# Patient Record
Sex: MALE | Race: WHITE | NOT HISPANIC OR LATINO | URBAN - METROPOLITAN AREA
[De-identification: names, ages, dates, MRNs, and addresses within clinical notes are randomized per-mention and may not be internally consistent; named-entity substitution may affect disease eponyms.]

---

## 2019-02-02 ENCOUNTER — INPATIENT (INPATIENT)
Facility: HOSPITAL | Age: 54
LOS: 0 days | Discharge: HOME | DRG: 93 | End: 2019-02-03
Attending: PSYCHIATRY & NEUROLOGY | Admitting: PSYCHIATRY & NEUROLOGY
Payer: COMMERCIAL

## 2019-02-02 VITALS
RESPIRATION RATE: 17 BRPM | WEIGHT: 193.57 LBS | TEMPERATURE: 97 F | SYSTOLIC BLOOD PRESSURE: 131 MMHG | DIASTOLIC BLOOD PRESSURE: 80 MMHG | HEART RATE: 94 BPM | OXYGEN SATURATION: 97 %

## 2019-02-02 LAB
ALBUMIN SERPL ELPH-MCNC: 4.7 G/DL — SIGNIFICANT CHANGE UP (ref 3.3–5)
ALP SERPL-CCNC: 54 U/L — SIGNIFICANT CHANGE UP (ref 40–120)
ALT FLD-CCNC: 21 U/L — SIGNIFICANT CHANGE UP (ref 10–45)
ANION GAP SERPL CALC-SCNC: 10 MMOL/L — SIGNIFICANT CHANGE UP (ref 5–17)
APTT BLD: 33.7 SEC — SIGNIFICANT CHANGE UP (ref 27.5–36.3)
AST SERPL-CCNC: 16 U/L — SIGNIFICANT CHANGE UP (ref 10–40)
BASOPHILS NFR BLD AUTO: 0.9 % — SIGNIFICANT CHANGE UP (ref 0–2)
BILIRUB SERPL-MCNC: 0.3 MG/DL — SIGNIFICANT CHANGE UP (ref 0.2–1.2)
BLD GP AB SCN SERPL QL: NEGATIVE — SIGNIFICANT CHANGE UP
BUN SERPL-MCNC: 19 MG/DL — SIGNIFICANT CHANGE UP (ref 7–23)
CALCIUM SERPL-MCNC: 10.1 MG/DL — SIGNIFICANT CHANGE UP (ref 8.4–10.5)
CHLORIDE SERPL-SCNC: 101 MMOL/L — SIGNIFICANT CHANGE UP (ref 96–108)
CO2 SERPL-SCNC: 29 MMOL/L — SIGNIFICANT CHANGE UP (ref 22–31)
CREAT SERPL-MCNC: 1.01 MG/DL — SIGNIFICANT CHANGE UP (ref 0.5–1.3)
EOSINOPHIL NFR BLD AUTO: 1.7 % — SIGNIFICANT CHANGE UP (ref 0–6)
GLUCOSE SERPL-MCNC: 85 MG/DL — SIGNIFICANT CHANGE UP (ref 70–99)
HCT VFR BLD CALC: 46 % — SIGNIFICANT CHANGE UP (ref 39–50)
HGB BLD-MCNC: 15.8 G/DL — SIGNIFICANT CHANGE UP (ref 13–17)
INR BLD: 0.98 — SIGNIFICANT CHANGE UP (ref 0.88–1.16)
LYMPHOCYTES # BLD AUTO: 38.3 % — SIGNIFICANT CHANGE UP (ref 13–44)
MCHC RBC-ENTMCNC: 31.5 PG — SIGNIFICANT CHANGE UP (ref 27–34)
MCHC RBC-ENTMCNC: 34.3 G/DL — SIGNIFICANT CHANGE UP (ref 32–36)
MCV RBC AUTO: 91.6 FL — SIGNIFICANT CHANGE UP (ref 80–100)
MONOCYTES NFR BLD AUTO: 13.8 % — SIGNIFICANT CHANGE UP (ref 2–14)
NEUTROPHILS NFR BLD AUTO: 45.3 % — SIGNIFICANT CHANGE UP (ref 43–77)
PLATELET # BLD AUTO: 252 K/UL — SIGNIFICANT CHANGE UP (ref 150–400)
POTASSIUM SERPL-MCNC: 4.4 MMOL/L — SIGNIFICANT CHANGE UP (ref 3.5–5.3)
POTASSIUM SERPL-SCNC: 4.4 MMOL/L — SIGNIFICANT CHANGE UP (ref 3.5–5.3)
PROT SERPL-MCNC: 7.3 G/DL — SIGNIFICANT CHANGE UP (ref 6–8.3)
PROTHROM AB SERPL-ACNC: 11.1 SEC — SIGNIFICANT CHANGE UP (ref 10–12.9)
RBC # BLD: 5.02 M/UL — SIGNIFICANT CHANGE UP (ref 4.2–5.8)
RBC # FLD: 15 % — SIGNIFICANT CHANGE UP (ref 10.3–16.9)
RH IG SCN BLD-IMP: POSITIVE — SIGNIFICANT CHANGE UP
SODIUM SERPL-SCNC: 140 MMOL/L — SIGNIFICANT CHANGE UP (ref 135–145)
WBC # BLD: 6.4 K/UL — SIGNIFICANT CHANGE UP (ref 3.8–10.5)
WBC # FLD AUTO: 6.4 K/UL — SIGNIFICANT CHANGE UP (ref 3.8–10.5)

## 2019-02-02 PROCEDURE — 71045 X-RAY EXAM CHEST 1 VIEW: CPT | Mod: 26

## 2019-02-02 PROCEDURE — 70450 CT HEAD/BRAIN W/O DYE: CPT | Mod: 26,59

## 2019-02-02 PROCEDURE — 99291 CRITICAL CARE FIRST HOUR: CPT

## 2019-02-02 PROCEDURE — 70450 CT HEAD/BRAIN W/O DYE: CPT | Mod: 26,77

## 2019-02-02 PROCEDURE — 70496 CT ANGIOGRAPHY HEAD: CPT | Mod: 26

## 2019-02-02 PROCEDURE — 0042T: CPT

## 2019-02-02 PROCEDURE — ZZZZZ: CPT

## 2019-02-02 PROCEDURE — 70498 CT ANGIOGRAPHY NECK: CPT | Mod: 26

## 2019-02-02 RX ORDER — ZOLPIDEM TARTRATE 10 MG/1
5 TABLET ORAL ONCE
Qty: 0 | Refills: 0 | Status: DISCONTINUED | OUTPATIENT
Start: 2019-02-02 | End: 2019-02-03

## 2019-02-02 RX ORDER — CHLORHEXIDINE GLUCONATE 213 G/1000ML
1 SOLUTION TOPICAL
Qty: 0 | Refills: 0 | Status: DISCONTINUED | OUTPATIENT
Start: 2019-02-02 | End: 2019-02-03

## 2019-02-02 RX ORDER — LANOLIN ALCOHOL/MO/W.PET/CERES
5 CREAM (GRAM) TOPICAL AT BEDTIME
Qty: 0 | Refills: 0 | Status: DISCONTINUED | OUTPATIENT
Start: 2019-02-02 | End: 2019-02-03

## 2019-02-02 NOTE — ED CLERICAL - NS ED CLERK NOTE PRE-ARRIVAL INFORMATION; ADDITIONAL PRE-ARRIVAL INFORMATION
54 Y/O SOLE, Arnie Manzano ( 1965). Being Sent by Dr. Marte ([phone]) for NRO Eval and to R/O TIA vs. CVA.

## 2019-02-02 NOTE — H&P ADULT - ASSESSMENT
54 yo M with a history of cavernous angioma since childhood, here with acute onset L sided numbness/weakness and L facial numbness. His symptoms have since resolved, however given concern for cerebellar hemorrhage admitted to ICU for close neurological monitoring.     Neurology:  #cavernous angioma, r/o hemorrhage - given symptoms of L sided weakness and numbness (now resolved) and L sided facial numbness, c/f small hemorrhage associated with angioma.   -     Pulmonary:    Cardiovascular:    GI:    :    MSK:    Endocrine:    Heme:    ID:    FEN: NPO, Replete lytes PRN K>4, Mg>2    PPx: Hep SQ, SCDs    Code: FULL CODE    Dispo: Patient requires continued monitoring in MICU 54 yo M with a history of cavernous angioma since childhood, here with acute onset L sided numbness/weakness and L facial numbness. His symptoms have since resolved, however given concern for cerebellar hemorrhage admitted to ICU for close neurological monitoring.     Neurology:  #cavernous angioma, r/o hemorrhage - given symptoms of L sided weakness and numbness (now resolved) and L sided facial numbness, c/f small hemorrhage associated with angioma.  - frequent neuro checks  - repeat CT scan in 6 hours or earlier with new symptoms  - hold home aspirin  - transfuse 1 U platelets now due to platelet dysfunction from aspirin  - maintain SBP < 140    Pulmonary:  #no active issues    Cardiovascular:  #hemodynamics - NSR and hemodynamically stable  - strict SBPs < 140    - hold home aspirin    GI:  #no active issues    :  #no active issues    MSK:  #no active issues    Endocrine:  #no active issues    ID:  #no active issues    FEN: regular diet, Replete lytes PRN K>4, Mg>2, no IVF    PPx: holding HSQ due to possible hemorrhage; SCDs    Code: FULL CODE    Dispo: Patient requires continued monitoring in MICU, plan to stepdown to [address] [place] after 24 hours if no change.

## 2019-02-02 NOTE — ED PROVIDER NOTE - MEDICAL DECISION MAKING DETAILS
Pt in ED with neuro symptoms of left sided upper and lower extremity transient weakness and left sided facial numbness/tingling - all symptoms are resolved on ER arrival.  Code stroke called on ED arrival and patient is taken for imaging.  Stroke team in ED to eval patient and in agreement for current neurological deficits at this time.  CT imaging showing small cerebellar hemorrhage and Dr. Whaley requesting admission to him with placement to ICU for 24 hours prior to stepdown.  Neurosx team is also consulted and will see patient in ED.  Pt noted to be on ASA, however Dr. Whaley does not want platelets, etc at this time.  Plan is discussed with patient who is in agreement.  Will admit at this time.

## 2019-02-02 NOTE — ED ADULT NURSE NOTE - OBJECTIVE STATEMENT
pt presents stating he woke up at 0300 with left sided weakness and a numbness to left side of head.  states it resolved on its own and then came back at 0700.  states he had similar episode 10 years ago and was dx with TIA.  stroke code called.  CT/CTA completed.  Neuro at bedside.  no active deficits noted.  speaking in full clear sentences.  equal strength noted BL.  ambulates with steady gait. denies chest pain, palpitations, sob, dizziness.

## 2019-02-02 NOTE — CONSULT NOTE ADULT - SUBJECTIVE AND OBJECTIVE BOX
53 year old male with history of prior TIA (about 10 years ago) presents to ED with concern for waking up this morning with weakness to left upper and lower extremity and left sided facial tingling/numbness that lasted 30 minutes before resolving completely.  Patient states he was ok when he went to sleep yesterday evening at 11pm and awoke with these symptoms.  He denies associated headache, changes to vision, chest pain, shortness of breath, abdominal pain, nausea, vomiting, fever, chills, or any additional acute complaints or concerns at this time.  Patient states he takes a daily ASA 81mg for history of TIA and a sleeping pill PRN.     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	  · EYES: no discharge, no irritation, no pain, no redness, and no visual changes.	  · ENMT: Ears: no ear pain and no hearing problems.Nose: no nasal congestion and no nasal drainage.Mouth/Throat: no dysphagia, no hoarseness and no throat pain.Neck: no lumps, no pain, no stiffness and no swollen glands.	  · CARDIOVASCULAR: no chest pain and no edema.	  · RESPIRATORY: no chest pain, no cough, and no shortness of breath.	  · GASTROINTESTINAL: no abdominal pain, no bloating, no constipation, no diarrhea, no nausea and no vomiting.	  · GENITOURINARY: no dysuria, no frequency, and no hematuria.	  · MUSCULOSKELETAL: no back pain, no gout, no musculoskeletal pain, no neck pain, and no weakness.	  · SKIN: no abrasions, no jaundice, no lesions, no pruritis, and no rashes.	  · NEUROLOGICAL: - - -	  · Neurological [+]: + left sided upper and lower extremity weakness - resolved.  + Left sided facial numbness/tingling.	  · PSYCHIATRIC: no known mental health issues.	    PHYSICAL EXAM:   · CONSTITUTIONAL: Well appearing, well nourished, awake, alert, oriented to person, place, time/situation and in no apparent distress.	  · ENMT: Airway patent, Nasal mucosa clear. Mouth with normal mucosa. Throat has no vesicles, no oropharyngeal exudates and uvula is midline.	  · EYES: Clear bilaterally, pupils equal, round and reactive to light.	  · CARDIAC: Normal rate, regular rhythm.  Heart sounds S1, S2.  No murmurs, rubs or gallops.	  · RESPIRATORY: Breath sounds clear and equal bilaterally.	  · GASTROINTESTINAL: Abdomen soft, non-tender, no guarding.	  · MUSCULOSKELETAL: Spine appears normal, range of motion is not limited, no muscle or joint tenderness	  · NEUROLOGICAL: Alert and oriented, 5/5 strength x 4 extremities against gravity and external force.  No facial asymmetry.  Speaking without slurred speech.  Sensation is intact and symmetric x 4 extremities, sensation intact and symmetric to face.	  · SKIN: Skin normal color for race, warm, dry and intact. No evidence of rash.	    CT 2/2 head demonstrates small right cerebellar hyperdensity    CTA 2/2 head demonstrates right DVA vs venous angioma 53 year old male with history of prior TIA (about 10 years ago) presents to ED with concern for waking up this morning with weakness to left upper and lower extremity and left sided facial tingling/numbness that lasted 30 minutes before resolving completely.  Patient states he was ok when he went to sleep yesterday evening at 11pm and awoke with these symptoms.  He denies associated headache, changes to vision, chest pain, shortness of breath, abdominal pain, nausea, vomiting, fever, chills, or any additional acute complaints or concerns at this time.  Patient states he takes a daily ASA 81mg for history of TIA and a sleeping pill PRN.     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	  · EYES: no discharge, no irritation, no pain, no redness, and no visual changes.	  · ENMT: Ears: no ear pain and no hearing problems.Nose: no nasal congestion and no nasal drainage.Mouth/Throat: no dysphagia, no hoarseness and no throat pain.Neck: no lumps, no pain, no stiffness and no swollen glands.	  · CARDIOVASCULAR: no chest pain and no edema.	  · RESPIRATORY: no chest pain, no cough, and no shortness of breath.	  · GASTROINTESTINAL: no abdominal pain, no bloating, no constipation, no diarrhea, no nausea and no vomiting.	  · GENITOURINARY: no dysuria, no frequency, and no hematuria.	  · MUSCULOSKELETAL: no back pain, no gout, no musculoskeletal pain, no neck pain, and no weakness.	  · SKIN: no abrasions, no jaundice, no lesions, no pruritis, and no rashes.	  · NEUROLOGICAL: - - -	  · Neurological [+]: + left sided upper and lower extremity weakness - resolved.  + Left sided facial numbness/tingling.	  · PSYCHIATRIC: no known mental health issues.	    PHYSICAL EXAM:   · CONSTITUTIONAL: Well appearing, well nourished, awake, alert, oriented to person, place, time/situation and in no apparent distress.	  · ENMT: Airway patent, Nasal mucosa clear. Mouth with normal mucosa. Throat has no vesicles, no oropharyngeal exudates and uvula is midline.	  · EYES: Clear bilaterally, pupils equal, round and reactive to light.	  · CARDIAC: Normal rate, regular rhythm.  Heart sounds S1, S2.  No murmurs, rubs or gallops.	  · RESPIRATORY: Breath sounds clear and equal bilaterally.	  · GASTROINTESTINAL: Abdomen soft, non-tender, no guarding.	  · MUSCULOSKELETAL: Spine appears normal, range of motion is not limited, no muscle or joint tenderness	  · NEUROLOGICAL: Alert and oriented, 5/5 strength x 4 extremities against gravity and external force.  No facial asymmetry.  Speaking without slurred speech.  Sensation is intact and symmetric x 4 extremities, sensation intact and symmetric to face.	  · SKIN: Skin normal color for race, warm, dry and intact. No evidence of rash.	    CT 2/2 head demonstrates small right cerebellar hyperdensity    CTA 2/2 head demonstrates right developmental venous anomaly vs cavernous angioma

## 2019-02-02 NOTE — CONSULT NOTE ADULT - ASSESSMENT
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 Malik / [place]

## 2019-02-02 NOTE — CONSULT NOTE ADULT - SUBJECTIVE AND OBJECTIVE BOX
**STROKE CODE CONSULT NOTE**    Last known well time/Time of onset of symptoms: 11pm on 2/1    HPI:  53M mostly Urdu-speaking PMH seizure (one time episodes 30 yrs ago), R cavernoma, ?PFO presenting due to L sided numbness and weakness that woke him from sleep at 3am. Lasted 30 minutes and went back to sleep. Again, same symptoms woke him at 7am, which resolved but was followed by L facial numbness that persisted in the ED. Stroke code called with NIHSS 1 for chronic LUE and LLE numbness that he has had for many years. Denies any f/c, HA, CP, SOB ,abdominal pain, n/v/d/c, dysuria.    CT head showing R cerebellar small hemorrhage  ED Vitals: T 97.2F, HR 94, 131/80, 17, 97% on RA      PAST MEDICAL & SURGICAL HISTORY:  Cavernous angioma: of R cerebellum  No significant past surgical history      FAMILY HISTORY:  No pertinent family history in first degree relatives  - no strokes, HTN, heart attack      MEDICATIONS  (STANDING):  chlorhexidine 2% Cloths 1 Application(s) Topical <User Schedule>  melatonin 5 milliGRAM(s) Oral at bedtime    MEDICATIONS  (PRN):  zolpidem 5 milliGRAM(s) Oral once PRN Insomnia      Allergies    No Known Allergies    Intolerances        Vital Signs Last 24 Hrs  T(C): 36.4 (02 Feb 2019 21:54), Max: 36.8 (02 Feb 2019 13:23)  T(F): 97.6 (02 Feb 2019 21:54), Max: 98.2 (02 Feb 2019 13:23)  HR: 70 (02 Feb 2019 23:00) (64 - 98)  BP: 114/70 (02 Feb 2019 23:00) (114/70 - 141/71)  BP(mean): 86 (02 Feb 2019 23:00) (85 - 105)  RR: 19 (02 Feb 2019 23:00) (16 - 25)  SpO2: 96% (02 Feb 2019 23:00) (95% - 99%)    PHYSICAL EXAM:  Constitutional: WDWN; NAD  Cardiovascular: RRR, no appreciable murmurs; no carotid bruits  Neurologic:  Mental status: Awake, alert and oriented x3. Attention/concentration intact.  No dysarthria, no aphasia.  Fund of knowledge appropriate.    Cranial nerves: Pupils equally round and reactive to light, visual fields full, no nystagmus, extraocular muscles intact, V1 through V3 intact bilaterally and symmetric, face symmetric, palate elevation symmetric, tongue was midline, sternocleidomastoid/shoulder shrug strength bilaterally 5/5.    Motor:  Normal bulk and tone, strength 5/5 in bilateral upper and lower extremities.   strength 5/5.  Rapid alternating movements intact and symmetric.   Sensation: Intact to light touch.  No neglect.   Coordination: No dysmetria on finger-to-nose and heel-to-shin.  No clumsiness.  Gait: deferred    NIHSS:    Fingerstick Blood Glucose: CAPILLARY BLOOD GLUCOSE  89 (02 Feb 2019 12:41)      POCT Blood Glucose.: 89 mg/dL (02 Feb 2019 11:32)       LABS:                        15.8   6.4   )-----------( 252      ( 02 Feb 2019 11:47 )             46.0     02-02    140  |  101  |  19  ----------------------------<  85  4.4   |  29  |  1.01    Ca    10.1      02 Feb 2019 11:47  Mg     2.2     02-02    TPro  7.3  /  Alb  4.7  /  TBili  0.3  /  DBili  x   /  AST  16  /  ALT  21  /  AlkPhos  54  02-02    PT/INR - ( 02 Feb 2019 11:47 )   PT: 11.1 sec;   INR: 0.98          PTT - ( 02 Feb 2019 11:47 )  PTT:33.7 sec  CARDIAC MARKERS ( 02 Feb 2019 11:47 )  x     / <0.01 ng/mL / x     / x     / x              RADIOLOGY & ADDITIONAL STUDIES:    CT Head No Cont (02.02.19 @ 12:16)   Small hyperdense focus in the right cerebellum with probable   thin linear area of low attenuating along its anterior margin possibly   representing a minimal amount of parenchymal edema. Differential   diagnosis include the following: Cavernoma, small parenchymal hematoma,   or hyperdense/hemorrhagic lesion. Further evaluation with contrast MRI   can be obtained for clarification.    No evidence of acute infarction.     CT Perfusion w/ Maps w/ IV Cont (02.02.19 @ 12:18)  No areas of perfusion abnormality.     CT Angio Neck w/ IV Cont (02.02.19 @ 12:19)  No high-grade stenotic or occlusive lesion.    No hemodynamic significant stenosis at the carotid bifurcation.    Draining veins within the right lateral cerebellum adjacent to the   hyperdense focus. Constellation of findings are suggestive for   developmental venous anomalies with and adjacent cavernous angioma. MRI   with contrast can be obtained for confirmation.          IV-tPA (Y/N):    No                                 Reason IV-tPA not given: ICH      ASSESSMENT/PLAN:  53M mostly Urdu-speaking PMH seizure (one time episodes 30 yrs ago), R cavernoma, ?PFO presenting due to L sided numbness and weakness that woke him from sleep twice overnight, found to have a R cerebellar hemorrhage likely from R cavernoma.    #R cerebellar hemorrhage from R cavernoma  - hold antiplatelets  - transfuse 1u plts in setting of recent aspirin and NSAID use  - repeat CT head in 6 hours to assess for changes in hemorrhage  - neurosurgery consulted, f/u recs  - SBP goal <140

## 2019-02-02 NOTE — H&P ADULT - NSHPLABSRESULTS_GEN_ALL_CORE
LABS:                        15.8   6.4   )-----------( 252      ( 02 Feb 2019 11:47 )             46.0     02-02    140  |  101  |  19  ----------------------------<  85  4.4   |  29  |  1.01    Ca    10.1      02 Feb 2019 11:47  Mg     2.2     02-02    TPro  7.3  /  Alb  4.7  /  TBili  0.3  /  DBili  x   /  AST  16  /  ALT  21  /  AlkPhos  54  02-02    PT/INR - ( 02 Feb 2019 11:47 )   PT: 11.1 sec;   INR: 0.98     PTT - ( 02 Feb 2019 11:47 )  PTT:33.7 sec    CAPILLARY BLOOD GLUCOSE  89 (02 Feb 2019 12:41)    POCT Blood Glucose.: 89 mg/dL (02 Feb 2019 11:32)    RADIOLOGY & ADDITIONAL TESTS:  < from: CT Head No Cont (02.02.19 @ 12:16) >      IMPRESSION: Small hyperdense focus in the right cerebellum with probable   thin linear area of low attenuating along its anterior margin possibly   representing a minimal amount of parenchymal edema. Differential   diagnosis include the following: Cavernoma, small parenchymal hematoma,   or hyperdense/hemorrhagic lesion. Further evaluation with contrast MRI   can be obtained for clarification.    No evidence of acute infarction.    < end of copied text >    < from: CT Perfusion w/ Maps w/ IV Cont (02.02.19 @ 12:18) >    IMPRESSION: No areas of perfusion abnormality.    < end of copied text >    < from: CT Angio Neck w/ IV Cont (02.02.19 @ 12:19) >    IMPRESSION:   No high-grade stenotic or occlusive lesion.    No hemodynamic significant stenosis at the carotid bifurcation.    Draining veins within the right lateral cerebellum adjacent to the   hyperdense focus. Constellation of findings are suggestive for   developmental venous anomalies with and adjacent cavernous angioma. MRI   with contrast can be obtained for confirmation.    < end of copied text >

## 2019-02-02 NOTE — ED ADULT TRIAGE NOTE - CHIEF COMPLAINT QUOTE
Patient, ambulating with steady gait, complaining of left UE and LE weakness and heaviness, starting at 3:00AM this morning.  Symptoms resolved and returned at 7:30AM.  No facial droop, slurred speech or neuro deficits notes.  Patient denies any CP, SOB, dizziness, N/V/D or any other complaints.  FS 89, EKG in progress.

## 2019-02-02 NOTE — H&P ADULT - NSHPSOCIALHISTORY_GEN_ALL_CORE
Former smoker, <1 ppd x 15 years, quit in 2003. Rare alcohol drinker. No illicit drug use.   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  with 2 children - wife is currently in [place], 1 child in [place], 1 child in [place].

## 2019-02-02 NOTE — H&P ADULT - NSHPPHYSICALEXAM_GEN_ALL_CORE
PHYSICAL EXAM:   VITAL SIGNS:  T(C): 36.6 (02-02-19 @ 14:44), Max: 36.8 (02-02-19 @ 13:23)  HR: 70 (02-02-19 @ 16:00) (64 - 94)  BP: 135/92 (02-02-19 @ 16:00) (127/77 - 139/87)  RR: 20 (02-02-19 @ 16:00) (16 - 20)  SpO2: 97% (02-02-19 @ 16:00) (96% - 97%)  Constitutional: well appearing middle aged man resting comfortably in bed; NAD  Head: NC/AT  Eyes: clear conjunctiva, no scleral icterus  ENT: no nasal discharge; uvula midline, no oropharyngeal erythema or exudates; MMM  Neck: supple; no JVD  Respiratory: CTA B/L; no W/R/R  Cardiac: +S1/S2; RRR; no M/R/G  Gastrointestinal: soft, NT/ND; no rebound or guarding  Extremities: WWP, no clubbing or cyanosis; no peripheral edema  Vascular: 2+ radial, DP/PT pulses B/L  Dermatologic: skin warm, dry and intact; no rashes, wounds, or scars  Neurologic: AAOx3; PERRL, EOMI, no facial weakness or asymmetry, mild numbness in L lower face, shoulder shrug and head turn equal bilaterally, tongue midline, uvula midline. 5/5 strength in all four extremities, no sensory deficits, no dysdiadochokinesia.

## 2019-02-02 NOTE — H&P ADULT - HISTORY OF PRESENT ILLNESS
54 yo M with a history of kal 54 yo M with a history of cavernous angioma since childhood, prior TIA 10 years ago, on aspirin for ASCVD prophylaxis, who presented to the ED with L sided facial numbness and arm/leg weakness and numbness x 30 minutes. The patient was in his usual state of health when last night he had an episode of acute left arm and leg numbness and weakness, which resolved after 30 minutes. Then, on waking this morning, he had a second episode of arm and leg weakness in addition to lower facial numbness; the arm and leg symptoms resolved after 30 minutes but the facial tingling/numbness has persisted. He also complains of mild holocephalic headache (1/10 in intensity). He denies f/c, CP, SOB, n/v/d, abd pain, gait abnormalities, dysarthria, dysphagia.     In the [facility] ED, /80, HR 84, RR 17, T 97.2F, O2Sat 97% on RA. Stroke code was called, NIHSS 1 for L facial numbness/tingling. CT and CTA H&N showed small hyperdense focus in R cerebellum with possible minimal amount of parenchymal edema, with contrast drainage suggesting developmental venous anomalies and adjacent cavernous angioma. He was admitted to the MICU for close neurological monitoring. 54 yo M with a history of cavernous angioma since childhood, prior TIA 10 years ago, on aspirin for ASCVD prophylaxis, who presented to the ED with L sided facial numbness and arm/leg weakness and numbness x 30 minutes. The patient was in his usual state of health when last night he had an episode of acute left arm and leg numbness and weakness, which resolved after 30 minutes. Then, on waking this morning at 7 AM, he had a second episode of arm and leg weakness in addition to lower facial numbness; the arm and leg symptoms resolved after 30 minutes but the facial tingling/numbness has persisted. He also complains of mild holocephalic headache (1/10 in intensity). He denies f/c, CP, SOB, n/v/d, abd pain, gait abnormalities, dysarthria, dysphagia.     In the [facility] ED, /80, HR 84, RR 17, T 97.2F, O2Sat 97% on RA. Stroke code was called, NIHSS 1 for L facial numbness/tingling. CT and CTA H&N showed small hyperdense focus in R cerebellum with possible minimal amount of parenchymal edema, with contrast drainage suggesting developmental venous anomalies and adjacent cavernous angioma. He was admitted to the MICU for close neurological monitoring.

## 2019-02-02 NOTE — ED PROVIDER NOTE - OBJECTIVE STATEMENT
53 year old male with history of prior TIA (apx 10 years ago) presents to ED with concern for waking up this morning with weakness to left upper and lower extremity and left sided facial tingling/numbness that lasted apx 30 minutes before resolving completely.  Patient states he was ok when he went to sleep yesterday evening at 11pm and awoke with these symptoms.  He denies associated headache, changes to vision, chest pain, shortness of breath, abdominal pain, nausea, vomiting, fever, chills, or any additional acute complaints or concerns at this time.  Patient states he takes a daily ASA for history of TIA and a sleeping pill PRN.

## 2019-02-02 NOTE — ED PROVIDER NOTE - CRITICAL CARE PROVIDED
additional history taking/interpretation of diagnostic studies/consultation with other physicians/documentation

## 2019-02-02 NOTE — ED PROVIDER NOTE - NEUROLOGICAL, MLM
Alert and oriented, 5/5 strength x 4 extremities against gravity and external force.  No facial asymmetry.  Speaking without slurred speech.  Sensation is intact and symmetric x 4 extremities, sensation intact and symmetric to face.

## 2019-02-03 VITALS — OXYGEN SATURATION: 95 % | RESPIRATION RATE: 19 BRPM | HEART RATE: 86 BPM

## 2019-02-03 LAB
ANION GAP SERPL CALC-SCNC: 11 MMOL/L — SIGNIFICANT CHANGE UP (ref 5–17)
APTT BLD: 32.4 SEC — SIGNIFICANT CHANGE UP (ref 27.5–36.3)
BUN SERPL-MCNC: 16 MG/DL — SIGNIFICANT CHANGE UP (ref 7–23)
CALCIUM SERPL-MCNC: 9.6 MG/DL — SIGNIFICANT CHANGE UP (ref 8.4–10.5)
CHLORIDE SERPL-SCNC: 107 MMOL/L — SIGNIFICANT CHANGE UP (ref 96–108)
CO2 SERPL-SCNC: 23 MMOL/L — SIGNIFICANT CHANGE UP (ref 22–31)
CREAT SERPL-MCNC: 0.89 MG/DL — SIGNIFICANT CHANGE UP (ref 0.5–1.3)
GLUCOSE SERPL-MCNC: 94 MG/DL — SIGNIFICANT CHANGE UP (ref 70–99)
HCT VFR BLD CALC: 43.1 % — SIGNIFICANT CHANGE UP (ref 39–50)
HCV AB S/CO SERPL IA: 0.06 S/CO — SIGNIFICANT CHANGE UP
HCV AB SERPL-IMP: SIGNIFICANT CHANGE UP
HGB BLD-MCNC: 15 G/DL — SIGNIFICANT CHANGE UP (ref 13–17)
INR BLD: 1.03 — SIGNIFICANT CHANGE UP (ref 0.88–1.16)
MCHC RBC-ENTMCNC: 31.8 PG — SIGNIFICANT CHANGE UP (ref 27–34)
MCHC RBC-ENTMCNC: 34.8 G/DL — SIGNIFICANT CHANGE UP (ref 32–36)
MCV RBC AUTO: 91.5 FL — SIGNIFICANT CHANGE UP (ref 80–100)
PLATELET # BLD AUTO: 277 K/UL — SIGNIFICANT CHANGE UP (ref 150–400)
POTASSIUM SERPL-MCNC: 4.4 MMOL/L — SIGNIFICANT CHANGE UP (ref 3.5–5.3)
POTASSIUM SERPL-SCNC: 4.4 MMOL/L — SIGNIFICANT CHANGE UP (ref 3.5–5.3)
PROTHROM AB SERPL-ACNC: 11.6 SEC — SIGNIFICANT CHANGE UP (ref 10–12.9)
RBC # BLD: 4.71 M/UL — SIGNIFICANT CHANGE UP (ref 4.2–5.8)
RBC # FLD: 15.1 % — SIGNIFICANT CHANGE UP (ref 10.3–16.9)
SODIUM SERPL-SCNC: 141 MMOL/L — SIGNIFICANT CHANGE UP (ref 135–145)
WBC # BLD: 6 K/UL — SIGNIFICANT CHANGE UP (ref 3.8–10.5)
WBC # FLD AUTO: 6 K/UL — SIGNIFICANT CHANGE UP (ref 3.8–10.5)

## 2019-02-03 PROCEDURE — 99291 CRITICAL CARE FIRST HOUR: CPT

## 2019-02-03 PROCEDURE — 85027 COMPLETE CBC AUTOMATED: CPT

## 2019-02-03 PROCEDURE — 70498 CT ANGIOGRAPHY NECK: CPT

## 2019-02-03 PROCEDURE — 85379 FIBRIN DEGRADATION QUANT: CPT

## 2019-02-03 PROCEDURE — 86850 RBC ANTIBODY SCREEN: CPT

## 2019-02-03 PROCEDURE — 70496 CT ANGIOGRAPHY HEAD: CPT

## 2019-02-03 PROCEDURE — 99233 SBSQ HOSP IP/OBS HIGH 50: CPT

## 2019-02-03 PROCEDURE — 82962 GLUCOSE BLOOD TEST: CPT

## 2019-02-03 PROCEDURE — 85730 THROMBOPLASTIN TIME PARTIAL: CPT

## 2019-02-03 PROCEDURE — 83735 ASSAY OF MAGNESIUM: CPT

## 2019-02-03 PROCEDURE — 36430 TRANSFUSION BLD/BLD COMPNT: CPT

## 2019-02-03 PROCEDURE — 86803 HEPATITIS C AB TEST: CPT

## 2019-02-03 PROCEDURE — 80048 BASIC METABOLIC PNL TOTAL CA: CPT

## 2019-02-03 PROCEDURE — 70450 CT HEAD/BRAIN W/O DYE: CPT

## 2019-02-03 PROCEDURE — 84484 ASSAY OF TROPONIN QUANT: CPT

## 2019-02-03 PROCEDURE — 36415 COLL VENOUS BLD VENIPUNCTURE: CPT

## 2019-02-03 PROCEDURE — 71045 X-RAY EXAM CHEST 1 VIEW: CPT

## 2019-02-03 PROCEDURE — 80053 COMPREHEN METABOLIC PANEL: CPT

## 2019-02-03 PROCEDURE — 86900 BLOOD TYPING SEROLOGIC ABO: CPT

## 2019-02-03 PROCEDURE — 0042T: CPT

## 2019-02-03 PROCEDURE — 86901 BLOOD TYPING SEROLOGIC RH(D): CPT

## 2019-02-03 PROCEDURE — 85610 PROTHROMBIN TIME: CPT

## 2019-02-03 PROCEDURE — 85025 COMPLETE CBC W/AUTO DIFF WBC: CPT

## 2019-02-03 PROCEDURE — P9035: CPT

## 2019-02-03 RX ORDER — ASPIRIN/CALCIUM CARB/MAGNESIUM 324 MG
0 TABLET ORAL
Qty: 0 | Refills: 0 | COMMUNITY

## 2019-02-03 RX ORDER — ACETAMINOPHEN 500 MG
1000 TABLET ORAL ONCE
Qty: 0 | Refills: 0 | Status: COMPLETED | OUTPATIENT
Start: 2019-02-03 | End: 2019-02-03

## 2019-02-03 RX ADMIN — CHLORHEXIDINE GLUCONATE 1 APPLICATION(S): 213 SOLUTION TOPICAL at 06:40

## 2019-02-03 NOTE — DISCHARGE NOTE ADULT - ADDITIONAL INSTRUCTIONS
Please follow up with your neurologist Dr. Shelby within the next 7 days. Please follow up with your neurologist Dr. Shelby within the next 7 days. [address] [place] [address]  [phone].  Please return to the hospital immediately if you experience any weakness, confusion, blurry vision or headache which is the worst headache you have ever experienced. If you have a mild to moderate headache, please take Tylenol. Do not take ibuprofen or aspirin until you have consulted Dr. Shelby.     It is important that you follow up with your neurologist Dr. Shelby within the next 7 days.

## 2019-02-03 NOTE — DISCHARGE NOTE ADULT - PATIENT PORTAL LINK FT
You can access the [email or web address], offered by [facility], by registering with the following website: [email or web address]

## 2019-02-03 NOTE — DISCHARGE NOTE ADULT - HOSPITAL COURSE
52 yo M with a history of cavernous angioma since childhood, prior TIA 10 years ago, on aspirin for ASCVD prophylaxis, who presented to the ED with L sided facial numbness and arm/leg weakness and numbness x 30 minutes. The patient was in his usual state of health when last night he had an episode of acute left arm and leg numbness and weakness, which resolved after 30 minutes. Then, on waking this morning at 7 AM, he had a second episode of arm and leg weakness in addition to lower facial numbness; the arm and leg symptoms resolved after 30 minutes but the facial tingling/numbness has persisted. He also complains of mild holocephalic headache (1/10 in intensity). He denies f/c, CP, SOB, n/v/d, abd pain, gait abnormalities, dysarthria, dysphagia.     In the [facility] ED, /80, HR 84, RR 17, T 97.2F, O2Sat 97% on RA. Stroke code was called, NIHSS 1 for L facial numbness/tingling. CT and CTA H&N showed small hyperdense focus in R cerebellum with possible minimal amount of parenchymal edema, with contrast drainage suggesting developmental venous anomalies and adjacent cavernous angioma. He was admitted to the MICU for close neurological monitoring. 54 yo M with a history of cavernous angioma since childhood, prior TIA 10 years ago, on aspirin for ASCVD prophylaxis, who presented to the ED with L sided facial numbness and arm/leg weakness and numbness x 30 minutes. Pt was in his usual state of health when last night he had an episode of acute left arm and leg numbness and weakness, which resolved after 30 minutes. Then, on waking this morning at 7 AM, he had a second episode of arm and leg weakness in addition to lower facial numbness; the arm and leg symptoms resolved after 30 minutes but the facial tingling/numbness has persisted. He also complains of mild holocephalic headache (1/10 in intensity). He denies f/c, CP, SOB, n/v/d, abd pain, gait abnormalities, dysarthria, dysphagia.     In the [facility] ED, /80, HR 84, RR 17, T 97.2F, O2Sat 97% on RA. Stroke code was called, NIHSS 1 for L facial numbness/tingling. CT and CTA H&N showed small hyperdense focus in R cerebellum with possible minimal amount of parenchymal edema, with contrast drainage suggesting developmental venous anomalies and adjacent cavernous angioma. He was admitted to the MICU for close neurological monitoring. 54 yo M with a history of cavernous angioma since childhood, prior TIA 10 years ago, on aspirin for ASCVD prophylaxis, who presented to the ED with L sided facial numbness and arm/leg weakness and numbness x 30 minutes. On the night prior to admission the pt was in his usual state of health when he had an episode of acute left arm and leg numbness and weakness, which resolved after 30 minutes. On the morning of admission, he had a second episode of arm and leg weakness in addition to lower facial numbness; the arm and leg symptoms resolved after 30 minutes but the facial tingling/numbness has persisted. He also complains of mild holocephalic headache (1/10 in intensity).   Stroke code was called, NIHSS 1 for L facial numbness/tingling. CT and CTA H&N showed small hyperdense focus in R cerebellum with possible minimal amount of parenchymal edema, with contrast drainage suggesting developmental venous anomalies and adjacent cavernous angioma. He was admitted to the MICU for close neurological monitoring. His sx improved throughout the course of his admission and resolved the next morning. He was discharged to home with instructions to return if he experienced weakness, confusion, blurry vision, or HA which is the worst HA he has ever experienced. Instructed to stop ASA, and to take Tylenol for HA with plan to follow up with his outpatient neurologist Dr. Efrain Shelby within 1 week of discharge.

## 2019-02-03 NOTE — DISCHARGE NOTE ADULT - PROVIDER TOKENS
FREE:[LAST:[Heron],FIRST:[Efrain],PHONE:[phone]],FAX:[(   [phone]    -],ADDRESS:[[address] [place], [place] [address] [phone]]]

## 2019-02-03 NOTE — PROGRESS NOTE ADULT - SUBJECTIVE AND OBJECTIVE BOX
INCOMPLETE NOTE  INTERVAL HPI/OVERNIGHT EVENTS:    SUBJECTIVE: Patient seen and examined at bedside.     CONSTITUTIONAL: No weakness, fevers or chills  EYES/ENT: No visual changes;  No vertigo or throat pain   NECK: No pain or stiffness  RESPIRATORY: No cough, wheezing, hemoptysis; No shortness of breath  CARDIOVASCULAR: No chest pain or palpitations  GASTROINTESTINAL: No abdominal or epigastric pain. No nausea, vomiting, or hematemesis; No diarrhea or constipation. No melena or hematochezia.  GENITOURINARY: No dysuria, frequency or hematuria  NEUROLOGICAL: No numbness or weakness  SKIN: No itching, rashes    OBJECTIVE:    VITAL SIGNS:  ICU Vital Signs Last 24 Hrs  T(C): 36.5 (03 Feb 2019 01:03), Max: 36.8 (02 Feb 2019 13:23)  T(F): 97.7 (03 Feb 2019 01:03), Max: 98.2 (02 Feb 2019 13:23)  HR: 62 (03 Feb 2019 06:00) (60 - 98)  BP: 110/69 (03 Feb 2019 06:00) (100/61 - 141/71)  BP(mean): 85 (03 Feb 2019 06:00) (71 - 105)  ABP: --  ABP(mean): --  RR: 15 (03 Feb 2019 06:00) (13 - 25)  SpO2: 95% (03 Feb 2019 06:00) (95% - 99%)        02-02 @ 07:01  -  02-03 @ 06:12  --------------------------------------------------------  IN: 228 mL / OUT: 600 mL / NET: -372 mL      CAPILLARY BLOOD GLUCOSE  89 (02 Feb 2019 12:41)      POCT Blood Glucose.: 89 mg/dL (02 Feb 2019 11:32)      PHYSICAL EXAM:    General: NAD  HEENT: NC/AT; PERRL, clear conjunctiva  Neck: supple  Respiratory: CTA b/l  Cardiovascular: +S1/S2; RRR  Abdomen: soft, NT/ND; +BS x4  Extremities: WWP, 2+ peripheral pulses b/l; no LE edema  Skin: normal color and turgor; no rash  Neurological:    MEDICATIONS:  MEDICATIONS  (STANDING):  chlorhexidine 2% Cloths 1 Application(s) Topical <User Schedule>  melatonin 5 milliGRAM(s) Oral at bedtime    MEDICATIONS  (PRN):  zolpidem 5 milliGRAM(s) Oral once PRN Insomnia      ALLERGIES:  Allergies    No Known Allergies    Intolerances        LABS:                        15.8   6.4   )-----------( 252      ( 02 Feb 2019 11:47 )             46.0     02-02    140  |  101  |  19  ----------------------------<  85  4.4   |  29  |  1.01    Ca    10.1      02 Feb 2019 11:47  Mg     2.2     02-02    TPro  7.3  /  Alb  4.7  /  TBili  0.3  /  DBili  x   /  AST  16  /  ALT  21  /  AlkPhos  54  02-02    PT/INR - ( 02 Feb 2019 11:47 )   PT: 11.1 sec;   INR: 0.98          PTT - ( 02 Feb 2019 11:47 )  PTT:33.7 sec      RADIOLOGY & ADDITIONAL TESTS: Reviewed.

## 2019-02-03 NOTE — DISCHARGE NOTE ADULT - CARE PLAN
Principal Discharge DX:	Cavernous angioma  Goal:	stable  Assessment and plan of treatment:	You have a past medical history of a cavernous angioma which you have had since childhood. When you came to the hospital, you had some weakness on the left side of your body and numbness on the left side of your face. Your past medical history and symptoms made us concerned that you had a small hemorrhage (bleeding in your brain). We obtained a CT of your head which showed no sign of hemorrhage. We obtained a repeat CT of your head which was stable and unchanged from the prior CT. The repeat CT also did not show any sign of hemorrhage. We monitored your symptoms and vital signs while you were in the hospital and you are stable. Your symptoms have resolved add you are now safe for discharge to home. It is important that you return to the hospital immediately if you experience any weakness, confusion, blurry vision or headache which is the worst headache you have ever experienced. If you have a mild to moderate headache, please take Tylenol and not ibuprofen or aspirin. It is important that you follow up with your neurologist Dr. Shelby within the next 7 days. Principal Discharge DX:	Cavernous angioma  Goal:	stable  Assessment and plan of treatment:	You have a past medical history of a cavernous angioma which you have had since childhood. When you came to the hospital, you had some weakness on the left side of your body and numbness on the left side of your face. Your past medical history and symptoms made us concerned that you had a small hemorrhage (bleeding in your brain). We obtained a CT of your head which showed no sign of hemorrhage. CT did show small hyperdense focus in your right cerebellum with a possible minimal amount of parenchymal edema, and also showed some evelopmental venous anomalies and cavernous angioma that you have had since childhood. We obtained a repeat CT of your head which was stable and unchanged from the prior CT. The repeat CT also did not show any sign of hemorrhage. We monitored your symptoms and vital signs while you were in the hospital and you are stable. Your symptoms have resolved add you are now safe for discharge to home. It is important that you return to the hospital immediately if you experience any weakness, confusion, blurry vision or headache which is the worst headache you have ever experienced. If you have a mild to moderate headache, please take Tylenol and not ibuprofen or aspirin. It is important that you follow up with your neurologist Dr. Shelby within the next 7 days.

## 2019-02-03 NOTE — DISCHARGE NOTE ADULT - PLAN OF CARE
stable You have a past medical history of a cavernous angioma which you have had since childhood. When you came to the hospital, you had some weakness on the left side of your body and numbness on the left side of your face. Your past medical history and symptoms made us concerned that you had a small hemorrhage (bleeding in your brain). We obtained a CT of your head which showed no sign of hemorrhage. We obtained a repeat CT of your head which was stable and unchanged from the prior CT. The repeat CT also did not show any sign of hemorrhage. We monitored your symptoms and vital signs while you were in the hospital and you are stable. Your symptoms have resolved add you are now safe for discharge to home. It is important that you return to the hospital immediately if you experience any weakness, confusion, blurry vision or headache which is the worst headache you have ever experienced. If you have a mild to moderate headache, please take Tylenol and not ibuprofen or aspirin. It is important that you follow up with your neurologist Dr. Shelby within the next 7 days. You have a past medical history of a cavernous angioma which you have had since childhood. When you came to the hospital, you had some weakness on the left side of your body and numbness on the left side of your face. Your past medical history and symptoms made us concerned that you had a small hemorrhage (bleeding in your brain). We obtained a CT of your head which showed no sign of hemorrhage. CT did show small hyperdense focus in your right cerebellum with a possible minimal amount of parenchymal edema, and also showed some evelopmental venous anomalies and cavernous angioma that you have had since childhood. We obtained a repeat CT of your head which was stable and unchanged from the prior CT. The repeat CT also did not show any sign of hemorrhage. We monitored your symptoms and vital signs while you were in the hospital and you are stable. Your symptoms have resolved add you are now safe for discharge to home. It is important that you return to the hospital immediately if you experience any weakness, confusion, blurry vision or headache which is the worst headache you have ever experienced. If you have a mild to moderate headache, please take Tylenol and not ibuprofen or aspirin. It is important that you follow up with your neurologist Dr. Shelby within the next 7 days.

## 2019-02-03 NOTE — DISCHARGE NOTE ADULT - MEDICATION SUMMARY - MEDICATIONS TO STOP TAKING
I will STOP taking the medications listed below when I get home from the hospital:    aspirin 81 mg oral tablet

## 2019-02-03 NOTE — DISCHARGE NOTE ADULT - CARE PROVIDER_API CALL
Efrain Shelby  [address] [facility]   [address] [place] [address]  [phone]  Phone: [phone]  Fax: (       -

## 2019-02-06 DIAGNOSIS — Z86.73 PERSONAL HISTORY OF TRANSIENT ISCHEMIC ATTACK (TIA), AND CEREBRAL INFARCTION WITHOUT RESIDUAL DEFICITS: ICD-10-CM

## 2019-02-06 DIAGNOSIS — R51 HEADACHE: ICD-10-CM

## 2019-02-06 DIAGNOSIS — R20.0 ANESTHESIA OF SKIN: ICD-10-CM

## 2019-02-06 DIAGNOSIS — Z87.891 PERSONAL HISTORY OF NICOTINE DEPENDENCE: ICD-10-CM

## 2019-02-06 DIAGNOSIS — R53.1 WEAKNESS: ICD-10-CM

## 2019-02-06 DIAGNOSIS — D18.02 HEMANGIOMA OF INTRACRANIAL STRUCTURES: ICD-10-CM

## 2019-02-06 DIAGNOSIS — Z79.82 LONG TERM (CURRENT) USE OF ASPIRIN: ICD-10-CM

## 2019-02-06 DIAGNOSIS — I61.4 NONTRAUMATIC INTRACEREBRAL HEMORRHAGE IN CEREBELLUM: ICD-10-CM

## 2019-03-19 NOTE — PROGRESS NOTE ADULT - SUBJECTIVE AND OBJECTIVE BOX
Recheck creat is better  Restart lasix at same dose-on 3/25/19  Please inform pt   Neurology Stroke Follow Up   10AM  Interval History:  Patient seen and examined.  He thinks that his left facial numbness has performed.  No weakness.  He complains of severe headache involving his whole head.  It's similar to his regular headaches that occur at least two times per month.    He follows with Dr. Efrain Shelby for Neurology who is aware of his headaches.  Treated previously with physical therapy with some relief.  He usually takes a [place] medication with relief.    Medications:  none    Allergies  No Known Allergies    Exam:  Vital Signs 136/73, HR 80, RR 17, Tc=96.6F    Gen: Lying in bed, calm, cooperative, in NAD  Eyes: anicteric sclera  CV: RRR  Extremities: 2+ radial pulses bilaterally    Neuro:  Mental status: Awake, alert and oriented x3, fluent-reading English newspaper without a problem, no dysarthria, follows all commands, recent and remote memory intact, attention/concentration intact, fund of knowledge appropriate.      Cranial nerves: Pupils equally round and reactive to light, visual fields full, no nystagmus, extraocular muscles intact, V1 sensation intact, DECREASED left V2 to 70%, left V3 essentially same on left, no facial droop, hearing intact to finger rub, palate elevation symmetric, tongue was midline, sternocleidomastoid/shoulder shrug strength bilaterally 5/5.    Motor:  No pronator drift. strength 5/5 in bilateral upper and lower extremities.  Normal bulk and tone,  Sensation: Intact to light touch, temperature bilaterally.  No neglect or extinction on DSS  Coordination: No dysmetria on finger-to-nose and heel-to-shin bilaterally    Reflexes: downgoing Babinski bilaterally  Gait: deferred    Labs:                        15.0   6.0   )-----------( 277      ( 03 Feb 2019 06:41 )             43.1     02-03    141  |  107  |  16  ----------------------------<  94  4.4   |  23  |  0.89    Ca    9.6      03 Feb 2019 06:41  Mg     2.2     02-02    LIVER FUNCTIONS - ( 02 Feb 2019 11:47 )  Alb: 4.7 g/dL / Pro: 7.3 g/dL / ALK PHOS: 54 U/L / ALT: 21 U/L / AST: 16 U/L / GGT: x           PT/INR - ( 03 Feb 2019 06:41 )   PT: 11.6 sec;   INR: 1.03     PTT - ( 03 Feb 2019 06:41 )  PTT:32.4 sec          Radiology:      Assessment:     Plan: Neurology Stroke Follow Up   10AM  Interval History:  Patient seen and examined.  He thinks that his left facial numbness has performed.  No weakness.  He complains of severe headache involving his whole head.  It's similar to his regular headaches that occur at least two times per month.    He follows with Dr. Efrain Shelby for Neurology who is aware of his headaches.  Treated previously with physical therapy with some relief.  He usually takes a [place] medication with relief.    Medications:  none    Allergies  No Known Allergies    Exam:  Vital Signs 136/73, HR 80, RR 17, Tc=96.6F    Gen: Lying in bed, calm, cooperative, in NAD  Eyes: anicteric sclera  CV: RRR  Extremities: 2+ radial pulses bilaterally    Neuro:  Mental status: Awake, alert and oriented x3, fluent-reading English newspaper without a problem, no dysarthria, follows all commands, recent and remote memory intact, attention/concentration intact, fund of knowledge appropriate.      Cranial nerves: Pupils equally round and reactive to light, visual fields full, no nystagmus, extraocular muscles intact, V1 sensation intact, DECREASED left V2 to 70%, left V3 essentially same on left, no facial droop, hearing intact to finger rub, palate elevation symmetric, tongue was midline, sternocleidomastoid/shoulder shrug strength bilaterally 5/5.    Motor:  No pronator drift. strength 5/5 in bilateral upper and lower extremities.  Normal bulk and tone,  Sensation: Intact to light touch, temperature bilaterally.  No neglect or extinction on DSS  Coordination: No dysmetria on finger-to-nose and heel-to-shin bilaterally    Reflexes: downgoing Babinski bilaterally  Gait: deferred    Labs:                        15.0   6.0   )-----------( 277      ( 03 Feb 2019 06:41 )             43.1     02-03    141  |  107  |  16  ----------------------------<  94  4.4   |  23  |  0.89    Ca    9.6      03 Feb 2019 06:41  Mg     2.2     02-02    LIVER FUNCTIONS - ( 02 Feb 2019 11:47 )  Alb: 4.7 g/dL / Pro: 7.3 g/dL / ALK PHOS: 54 U/L / ALT: 21 U/L / AST: 16 U/L / GGT: x           PT/INR - ( 03 Feb 2019 06:41 )   PT: 11.6 sec;   INR: 1.03     PTT - ( 03 Feb 2019 06:41 )  PTT:32.4 sec    Radiology:  CT Head No Cont (02.02.19 @ 19:45) >  There is been no interval change inappearance of hyperdense lesion in the right cerebellar hemisphere, likely representing an incidental   cavernous malformation without associated acute hemorrhage.    Assessment and plan: 53 year-old male with PMH including seizure, known right cerebellar cavernous malformation presents with left sided numbness similar to previous episodes of left hemisensory.  He was found to have small right cerebellar hemorrhage versus calcified cavernous malformation.  Repeat cerebral imaging as above - no interval change.  His left sided numbness has improved.  Now has headache probably due to sleep deprivation overnight as this is one of his triggers.    Treat headache symptomatically.  If controlled then can consider discharge with close follow up with his outpatient Neurologist for his cerebellar cavernous malformation.  He should avoid Aspirin and NSAIDs at this time.
